# Patient Record
Sex: FEMALE | Race: WHITE
[De-identification: names, ages, dates, MRNs, and addresses within clinical notes are randomized per-mention and may not be internally consistent; named-entity substitution may affect disease eponyms.]

---

## 2017-04-04 ENCOUNTER — HOSPITAL ENCOUNTER (EMERGENCY)
Dept: HOSPITAL 25 - UCCORT | Age: 47
Discharge: HOME | End: 2017-04-04
Payer: COMMERCIAL

## 2017-04-04 VITALS — DIASTOLIC BLOOD PRESSURE: 63 MMHG | SYSTOLIC BLOOD PRESSURE: 106 MMHG

## 2017-04-04 DIAGNOSIS — Z88.5: ICD-10-CM

## 2017-04-04 DIAGNOSIS — Z72.0: ICD-10-CM

## 2017-04-04 DIAGNOSIS — J06.9: Primary | ICD-10-CM

## 2017-04-04 DIAGNOSIS — E07.9: ICD-10-CM

## 2017-04-04 PROCEDURE — 99211 OFF/OP EST MAY X REQ PHY/QHP: CPT

## 2017-04-04 PROCEDURE — G0463 HOSPITAL OUTPT CLINIC VISIT: HCPCS

## 2017-04-04 NOTE — UC
Throat Pain/Nasal Negro HPI





- HPI Summary


HPI Summary: 





NASAL CONGESTION AND COUGH X 2 DAYS


NO FEVER , + CHILLS AND BODY ACHES 





- History of Current Complaint


Chief Complaint: UCRespiratory


Stated Complaint: CONGESTION,HEADACHE


Time Seen by Provider: 04/04/17 09:56


Hx Obtained From: Patient


Hx Last Menstrual Period: 2007


Onset/Duration: Gradual Onset, Lasting Days - 2, Still Present


Severity: Moderate


Cough: Nonproductive


Associated Signs & Symptoms: Positive: Wheezing, Nasal Discharge.  Negative: 

Sinus Discomfort, Fever, Rash





- Allergies/Home Medications


Allergies/Adverse Reactions: 


 Allergies











Allergy/AdvReac Type Severity Reaction Status Date / Time


 


Morphine Allergy  Difficulty Verified 04/04/17 09:29





   Breathing  











Home Medications: 


 Home Medications





Citalopram TAB* [CeleXA TAB*] 20 mg PO DAILY 04/04/17 [History Confirmed 04/04/ 17]


Levothyroxine TAB* [Synthroid TAB*] 75 mcg PO DAILY 04/04/17 [History Confirmed 

04/04/17]











PMH/Surg Hx/FS Hx/Imm Hx





- Additional Past Medical History


Additional PMH: 





MEDS: LEVOTHYROXIN AND CITALOPRAM 


Endocrine History Of: Reports: Thyroid Disease





- Surgical History


Surgical History: Yes


Surgery Procedure, Year, and Place: partial hysterectomy , uterine exploraatory 

before hysterectomy.  mole removal from fac





- Family History


Known Family History: Positive: None


   Negative: Diabetes





- Social History


Alcohol Use: Occasionally


Substance Use Type: None


Smoking Status (MU): Current Some Day Smoker


Type: Cigarettes





- Immunization History


Most Recent Influenza Vaccination: 2013





Review of Systems


Constitutional: Chills, Fatigue


Skin: Negative


Eyes: Negative


ENT: Nasal Discharge


Respiratory: Cough


Cardiovascular: Negative


Gastrointestinal: Negative


Genitourinary: Negative


All Other Systems Reviewed And Are Negative: Yes





Physical Exam


Triage Information Reviewed: Yes


Appearance: Well-Appearing, No Pain Distress, Well-Nourished


Vital Signs: 


 Initial Vital Signs











Temp  98.3 F   04/04/17 09:22


 


Pulse  72   04/04/17 09:22


 


Resp  16   04/04/17 09:22


 


BP  106/63   04/04/17 09:22


 


Pulse Ox  98   04/04/17 09:22











Vital Signs Reviewed: Yes


Eye Exam: Normal


Eyes: Positive: Conjunctiva Clear


ENT: Positive: Pharyngeal erythema, Nasal congestion, Nasal drainage, TMs normal


Neck exam: Normal


Neck: Positive: Supple, Nontender, No Lymphadenopathy


Respiratory: Positive: Chest non-tender, Lungs clear, Normal breath sounds


Cardiovascular: Positive: RRR, No Murmur, Pulses Normal


Abdominal Exam: Normal


Skin Exam: Normal





Throat Pain/Nasal Course/Dx





- Differential Dx/Diagnosis


Provider Diagnoses: URI





Discharge





- Discharge Plan


Condition: Stable


Disposition: HOME


Patient Education Materials:  Upper Respiratory Infection (ED)


Forms:  *Work Release


Referrals: 


Rashaun Caruso MD [Primary Care Provider] - If Needed

## 2018-05-28 ENCOUNTER — HOSPITAL ENCOUNTER (EMERGENCY)
Dept: HOSPITAL 25 - UCCORT | Age: 48
Discharge: ANOTHER HOSPITAL | End: 2018-05-28
Payer: COMMERCIAL

## 2018-05-28 VITALS — DIASTOLIC BLOOD PRESSURE: 66 MMHG | SYSTOLIC BLOOD PRESSURE: 130 MMHG
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
## 2018-05-28 DIAGNOSIS — S81.802A: Primary | ICD-10-CM

## 2018-05-28 DIAGNOSIS — L08.9: ICD-10-CM

## 2018-05-28 DIAGNOSIS — Z88.5: ICD-10-CM

## 2018-05-28 DIAGNOSIS — F17.210: ICD-10-CM

## 2018-05-28 DIAGNOSIS — Z83.2: ICD-10-CM
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
## 2018-05-28 DIAGNOSIS — L03.116: ICD-10-CM
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
## 2018-05-28 PROCEDURE — 99212 OFFICE O/P EST SF 10 MIN: CPT

## 2018-05-28 PROCEDURE — G0463 HOSPITAL OUTPT CLINIC VISIT: HCPCS

## 2018-05-28 NOTE — UC
Lower Extremity/Ankle HPI





- HPI Summary


HPI Summary: 





48 yo female c/o progressively worse L lower leg pain.  Has been camping this 

weekend, and on Sat (today is Mon) accidently hit leg against a step.  Last 

evening pain and swelling increased, unable to sleep last night d/t pain.  No 

fever / chills.  Denies sob / cp / palpitations.  No GI issues.  Minimal 

drainage to wound.  Tet immun utd per pt. 





- History of Current Complaint


Chief Complaint: UCLowerExtremity


Stated Complaint: LT LEG COMP


Time Seen by Provider: 05/28/18 11:31


Hx Obtained From: Patient


Hx Last Menstrual Period: 2007


Severity Currently: Severe


Pain Intensity: 8


Pain Scale Used: 0-10 Numeric


Aggravating Factor(s): Standing, Ambulation


Alleviating Factor(s): Nothing





- Risk Factors


DVT Risk Factors: Smoking, Family Hx of Clotting Disorder - multiple 1st and 

2nd degree relatives with hx blood clots





- Allergies/Home Medications


Allergies/Adverse Reactions: 


 Allergies











Allergy/AdvReac Type Severity Reaction Status Date / Time


 


morphine Allergy  Difficulty Verified 05/28/18 11:18





   Breathing  














PMH/Surg Hx/FS Hx/Imm Hx





- Additional Past Medical History


Additional PMH: 





Rx'd for lymphoma approx 10 yrs ago, recently told by [facility] that she remains 

cancer free. 


Previously Healthy: Yes - see above and hpi





- Surgical History


Surgical History: Yes


Surgery Procedure, Year, and Place: partial hysterectomy , uterine exploraatory 

before hysterectomy.  mole removal from fac





- Family History


Known Family History: Positive: None


   Negative: Diabetes





- Social History


Occupation: Employed Full-time


Alcohol Use: Occasionally


Substance Use Type: None


Smoking Status (MU): Current Some Day Smoker


Type: Cigarettes





- Immunization History


Most Recent Influenza Vaccination: 2013





Review of Systems


Constitutional: Fatigue


Skin: Other - see hpi


Eyes: Negative


ENT: Negative


Respiratory: Negative


Cardiovascular: Negative


Gastrointestinal: Negative


Genitourinary: Negative


Motor: Other - see hpi


Neurovascular: Negative


Musculoskeletal: Other: - see hpi


Neurological: Negative


Psychological: Negative


Is Patient Immunocompromised?: No


All Other Systems Reviewed And Are Negative: Yes





Physical Exam


Triage Information Reviewed: Yes


Appearance: Well-Nourished - sitting up, able to ambulate by herself but slowly

, looks uncomfortable


Vital Signs: 


 Initial Vital Signs











Temp  98.3 F   05/28/18 11:15


 


Pulse  92   05/28/18 11:15


 


Resp  16   05/28/18 11:15


 


BP  130/66   05/28/18 11:15


 


Pulse Ox  100   05/28/18 11:15











Vital Signs Reviewed: Yes


Eye Exam: Normal - grossly normal


ENT Exam: Normal - grossly normal


Neck exam: Normal


Neck: Positive: Supple


Respiratory Exam: Normal - no dyspnea, no tachypnea


Cardiovascular Exam: Normal - heart rate regular.   Pulses DP, PT palpable 

bilat.   Feet both warm to touch, with good distal cap refill x 5 toes


Abdominal Exam: Normal - sitting up, no complaints


Musculoskeletal Exam: Other - R foot dorsal area with dark eschar and redness, 

warm to touch, nonfluctuant 6.5cm width x 5cm.   L ant tib dark necrotic wound 

irregular shape approx 1.8cm L x 1.0cm W.  No mel purulence, very tender.  No 

crepitus.  + Periwound redness, warmth to touch approx 25cm L x 18cm W.  The 

redness is streaking, as such it is irregular and not sharply demarcated. Post 

knees nontender.  L calf is swollen, without mel Latisha's but reports that 

skin hurts with post calf pressure. BLE evidence of venous insufficiency.


Neurological Exam: Normal - grossly nonfocal


Psychological Exam: Normal - conversing easily and appropriately


Skin Exam: Other - see above musc skel.  non-diaphoretic.





Lower Extremity Course/Dx





- Course


Course Of Treatment: LLE redness and swelling s/p wounding event 2 days ago.  

Concern for progressive cellulitis or worse infection.  No sob / cp.  Strong 

fam hx dvt.  Will benefit from ED evaluation and management, including abx and u

/s.  Ms. Mchugh reports that she will go directly to ED.  Requests [place] ED 

d/t proximity in the setting of childcare concerns.  Declines EMS, will drive, 

 will meet her there.  Questions as posed answered to the best of my 

ability.  I spoke with Katherine Mcdaniels NP [facility].





- Differential Dx/Diagnosis


Provider Diagnoses: Left ant leg wound infection.  Left leg cellulitis.  L leg 

redness and swelling / edema





Discharge





- Sign-Out/Discharge


Documenting (check all that apply): Discharge/Admit/Transfer - to [place] ED, 

via POV





- Discharge Plan


Condition: Guarded


Disposition: TRANS HIGHER LVL OF CARE FAC


Patient Education Materials:  Cellulitis (ED), Leg Edema (ED)


Referrals: 


Rashaun Caruso MD [Primary Care Provider] - 


Additional Instructions: 


Go directly to the Emergency Department.  


I spoke with the Nurse Practitioner at the [place] [facility].





Please call [phone] if problems en route.  











- Billing Disposition and Condition


Condition: GUARDED


Disposition: EMTALA

## 2019-02-14 ENCOUNTER — HOSPITAL ENCOUNTER (EMERGENCY)
Dept: HOSPITAL 25 - UCCORT | Age: 49
Discharge: HOME | End: 2019-02-14
Payer: COMMERCIAL

## 2019-02-14 VITALS — DIASTOLIC BLOOD PRESSURE: 70 MMHG | SYSTOLIC BLOOD PRESSURE: 115 MMHG

## 2019-02-14 DIAGNOSIS — J02.9: ICD-10-CM

## 2019-02-14 DIAGNOSIS — H10.9: Primary | ICD-10-CM

## 2019-02-14 DIAGNOSIS — Z88.5: ICD-10-CM

## 2019-02-14 DIAGNOSIS — F17.210: ICD-10-CM

## 2019-02-14 DIAGNOSIS — R51: ICD-10-CM

## 2019-02-14 PROCEDURE — 99212 OFFICE O/P EST SF 10 MIN: CPT

## 2019-02-14 PROCEDURE — G0463 HOSPITAL OUTPT CLINIC VISIT: HCPCS

## 2019-02-14 NOTE — UC
Eye Complaint HPI





- HPI Summary


HPI Summary: 





2 days of eye discharge left greater than right, with injected eye. Normal 

vision. Has mild headache and sore throat today, but no fever or cough. 





- History of Current Complaint


Chief Complaint: UCEye


Stated Complaint: LEFT EYE CONCERN


Time Seen by Provider: 02/14/19 08:10


Hx Obtained From: Patient


Hx Last Menstrual Period: 2007


Onset/Duration: Sudden Onset, Lasting Days - 2


Timing: Constant


Severity Initially: Mild


Severity Currently: Mild


Pain Intensity: 0


Aggravating Factor(s): Nothing


Alleviating Factor(s): Nothing


Associated Signs And Symptoms: Positive: Drainage (Purulent)





- Risk Factors


Penetrating Injury Risk Factor: Negative


Globe Rupture Risk Factors: Negative


Optic Artery Occlusion Risk Factors: Negative





- Allergies/Home Medications


Allergies/Adverse Reactions: 


 Allergies











Allergy/AdvReac Type Severity Reaction Status Date / Time


 


morphine Allergy  Difficulty Verified 02/14/19 07:42





   Breathing  














PMH/Surg Hx/FS Hx/Imm Hx


Previously Healthy: Yes - History of lymphoma in 2008, cured


Endocrine History: Hypothyroidism


Psychological History: Depression





- Surgical History


Surgical History: Yes


Surgery Procedure, Year, and Place: partial hysterectomy , uterine exploraatory 

before hysterectomy.  mole removal from fac





- Family History


Known Family History: Positive: Cardiac Disease, Diabetes - mother





- Social History


Occupation: Employed Full-time


Alcohol Use: Occasionally


Substance Use Type: None


Smoking Status (MU): Light Every Day Tobacco Smoker


Type: Cigarettes


Amount Used/How Often: 1/2 PPD





- Immunization History


Most Recent Influenza Vaccination: 2013





Review of Systems


All Other Systems Reviewed And Are Negative: Yes


Constitutional: Positive: Negative


Skin: Positive: Negative


Eyes: Positive: Drainage, Eye Redness


ENT: Positive: Nasal Discharge


Respiratory: Positive: Negative


Cardiovascular: Positive: Negative


Gastrointestinal: Positive: Negative


Genitourinary: Positive: Negative


Motor: Positive: Negative


Neurovascular: Positive: Negative


Musculoskeletal: Positive: Negative


Neurological: Positive: Headache


Psychological: Positive: Negative


Is Patient Immunocompromised?: No





Physical Exam


Triage Information Reviewed: Yes


Appearance: Well-Appearing, No Pain Distress


Vital Signs: 


 Initial Vital Signs











Temp  98.0 F   02/14/19 07:42


 


Pulse  100   02/14/19 07:42


 


Resp  16   02/14/19 07:42


 


BP  115/70   02/14/19 07:42


 


Pulse Ox  98   02/14/19 07:42











Eye Exam: Other - no photophobia, no lid swelling


Eyes: Positive: Conjunctiva Inflamed, Discharge - scant discharge both eyes


ENT: Positive: Pharyngeal erythema, TMs normal


Neck: Positive: Supple, Nontender, No Lymphadenopathy


Respiratory: Positive: Lungs clear, Normal breath sounds


Cardiovascular: Positive: RRR, No Murmur


Neurological Exam: Normal


Neurological: Positive: Alert


Psychological Exam: Normal


Skin Exam: Normal





Eye Complaint Course/Dx





- Course


Course Of Treatment: antibacterial drops for conjunctivitis





- Differential Dx/Diagnosis


Differential Diagnosis/HQI/PQRI: Conjunctivitis


Provider Diagnosis: 


 Bilateral conjunctivitis








Discharge





- Sign-Out/Discharge


Documenting (check all that apply): Patient Departure


All imaging exams completed and their final reports reviewed: No Studies





- Discharge Plan


Condition: Stable


Disposition: HOME


Prescriptions: 


Polymyx/Trimethoprim OPTH* [Polytrim OPHTH*] 2 drop BOTH EYES Q3H #1 btl


Patient Education Materials:  Conjunctivitis (ED)


Forms:  *Work Release


Referrals: 


Rashaun Caruso MD [Primary Care Provider] - 


Additional Instructions: 


Compress your eyes with a warm moist cloth, then use drops to both eyes every 3 

hours while awake until resolved, usually 4 or 5 days. Disocontinue drops if 

they cause any eye irritation and follow up with your primary doctor. 








- Billing Disposition and Condition


Condition: STABLE


Disposition: Home

## 2020-02-18 ENCOUNTER — HOSPITAL ENCOUNTER (EMERGENCY)
Dept: HOSPITAL 25 - UCCORT | Age: 50
Discharge: HOME | End: 2020-02-18
Payer: COMMERCIAL

## 2020-02-18 VITALS — SYSTOLIC BLOOD PRESSURE: 122 MMHG | DIASTOLIC BLOOD PRESSURE: 70 MMHG

## 2020-02-18 DIAGNOSIS — Z88.5: ICD-10-CM

## 2020-02-18 DIAGNOSIS — J06.9: Primary | ICD-10-CM

## 2020-02-18 DIAGNOSIS — Z85.72: ICD-10-CM

## 2020-02-18 DIAGNOSIS — F17.210: ICD-10-CM

## 2020-02-18 PROCEDURE — 99211 OFF/OP EST MAY X REQ PHY/QHP: CPT

## 2020-02-18 PROCEDURE — G0463 HOSPITAL OUTPT CLINIC VISIT: HCPCS

## 2020-02-18 NOTE — UC
Respiratory Complaint HPI





- HPI Summary


HPI Summary: 





cough x 4 days 


cough is productive with yellow/ green sputum


worse with deep breathing, better with rest,


sore throat, pnd, fever / chills and body aches 





- History of Current Complaint


Chief Complaint: UCGeneralIllness


Stated Complaint: SORE THROAT, CONGESTION


Time Seen by Provider: 02/18/20 08:47


Hx Obtained From: Patient


Hx Last Menstrual Period: 2007


Pregnant?: No


Onset/Duration: Gradual Onset, Lasting Days - 4, Still Present


Timing: Constant


Severity Initially: Moderate


Severity Currently: Moderate


Pain Intensity: 3


Character: Cough: Productive


Aggravating Factors: Exertion, Deep Breaths


Alleviating Factors: Nothing


Associated Signs And Symptoms: Positive: Fever, Chills, URI, Nasal Congestion.  

Negative: Dyspnea, Wheezing, Calf Pain





- Allergies/Home Medications


Allergies/Adverse Reactions: 


 Allergies











Allergy/AdvReac Type Severity Reaction Status Date / Time


 


morphine Allergy  Difficulty Verified 02/18/20 08:47





   Breathing  











Home Medications: 


 Home Medications





Diphenhydra/Phenyleph/Acetamin [Cold & Flu Relief Multi-Sym Lq] 180 ml PO ONCE 

PRN 02/18/20 [History Confirmed 02/18/20]











PMH/Surg Hx/FS Hx/Imm Hx





- Additional Past Medical History


Additional PMH: 





Non hodgkins lymphoma- 2008-cured





- Surgical History


Surgical History: Yes


Surgery Procedure, Year, and Place: partial hysterectomy , uterine exploraatory 

before hysterectomy.  mole removal from fac





- Family History


Known Family History: Positive: None, Cardiac Disease, Diabetes - mother





- Social History


Alcohol Use: Occasionally


Substance Use Type: None


Smoking Status (MU): Light Every Day Tobacco Smoker


Type: Cigarettes


Amount Used/How Often: 1/2 PPD


Length of Time of Smoking/Using Tobacco: 30 yr


Have You Smoked in the Last Year: Yes





- Immunization History


Most Recent Influenza Vaccination: 2013





Review of Systems


All Other Systems Reviewed And Are Negative: Yes


Constitutional: Positive: Fever, Chills, Fatigue


ENT: Positive: Sore Throat


Respiratory: Positive: Cough


Musculoskeletal: Positive: Arthralgia, Myalgia


Is Patient Immunocompromised?: No





Physical Exam


Triage Information Reviewed: Yes


Appearance: Well-Appearing, No Pain Distress, Well-Nourished


Vital Signs: 


 Initial Vital Signs











Temp  97.1 F   02/18/20 08:40


 


Pulse  94   02/18/20 08:40


 


Resp  18   02/18/20 08:40


 


BP  122/70   02/18/20 08:40


 


Pulse Ox  97   02/18/20 08:40











Vital Signs Reviewed: Yes


Eye Exam: Normal


Eyes: Positive: Conjunctiva Clear


ENT: Positive: Normal ENT inspection, Hearing grossly normal, Pharyngeal 

erythema, TMs normal.  Negative: TM bulging, TM dull, TM red


Neck exam: Normal


Neck: Positive: Supple, Nontender, No Lymphadenopathy


Respiratory: Positive: Chest non-tender, Lungs clear, Normal breath sounds


Cardiovascular: Positive: RRR, No Murmur, Pulses Normal





Respiratory Course/Dx





- Differential Dx/Diagnosis


Provider Diagnosis: 


 URI (upper respiratory infection)








Discharge ED





- Sign-Out/Discharge


Documenting (check all that apply): Patient Departure


All imaging exams completed and their final reports reviewed: No Studies





- Discharge Plan


Condition: Stable


Disposition: HOME


Patient Education Materials:  Upper Respiratory Infection (ED)


Referrals: 


Rashaun Caruso MD [Primary Care Provider] - If Needed





- Billing Disposition and Condition


Condition: STABLE


Disposition: Home

## 2020-03-12 ENCOUNTER — HOSPITAL ENCOUNTER (EMERGENCY)
Dept: HOSPITAL 25 - UCCORT | Age: 50
Discharge: HOME | End: 2020-03-12
Payer: COMMERCIAL

## 2020-03-12 VITALS — DIASTOLIC BLOOD PRESSURE: 70 MMHG | SYSTOLIC BLOOD PRESSURE: 124 MMHG

## 2020-03-12 DIAGNOSIS — R51: ICD-10-CM

## 2020-03-12 DIAGNOSIS — E03.9: ICD-10-CM

## 2020-03-12 DIAGNOSIS — Z88.5: ICD-10-CM

## 2020-03-12 DIAGNOSIS — R05: ICD-10-CM

## 2020-03-12 DIAGNOSIS — B34.9: Primary | ICD-10-CM

## 2020-03-12 DIAGNOSIS — F41.9: ICD-10-CM

## 2020-03-12 DIAGNOSIS — F17.210: ICD-10-CM

## 2020-03-12 DIAGNOSIS — M79.10: ICD-10-CM

## 2020-03-12 DIAGNOSIS — Z79.899: ICD-10-CM

## 2020-03-12 DIAGNOSIS — Z79.890: ICD-10-CM

## 2020-03-12 PROCEDURE — G0463 HOSPITAL OUTPT CLINIC VISIT: HCPCS

## 2020-03-12 PROCEDURE — 99212 OFFICE O/P EST SF 10 MIN: CPT

## 2020-03-12 NOTE — UC
FLU HPI





- HPI Summary


HPI Summary: 





48 yo, awoke today with myalgias, headache, malaise, mild cough. 


Has not taken antipyretic. 


She is concerned that she has the flu. No hx of pneumona or asthma. 





- History of Current Complaint


Chief Complaint: UCGeneralIllness


Stated Complaint: FLU SYMP


Time Seen by Provider: 03/12/20 08:52


Hx Obtained From: Patient


Hx Last Menstrual Period: 2007


Onset/Duration: Sudden Onset, Lasting Hours


Severity Currently: Mild


Severity Initially: Mild


Pain Intensity: 0


Associated Signs & Symptoms: Positive: Myalgia, Cough, Headache





- Risk Factors


Influenza Risk Factors: Negative





- Allergy/Home Medications


Allergies/Adverse Reactions: 


 Allergies











Allergy/AdvReac Type Severity Reaction Status Date / Time


 


morphine Allergy  Difficulty Verified 03/12/20 08:40





   Breathing  











Home Medications: 


 Home Medications





Citalopram TAB* [CeleXA TAB*] 20 mg PO DAILY 04/04/17 [History Confirmed 03/12/ 20]


Levothyroxine TAB* [Synthroid TAB*] 75 mcg PO DAILY 04/04/17 [History Confirmed 

03/12/20]


Oseltamivir CAP* [Tamiflu CAP*] 75 mg PO BID #10 cap 03/12/20 [Rx]











PMH/Surg Hx/FS Hx/Imm Hx


Previously Healthy: Yes


Endocrine History: Hypothyroidism


Psychological History: Anxiety





- Surgical History


Surgical History: Yes


Surgery Procedure, Year, and Place: partial hysterectomy , uterine exploraatory 

before hysterectomy.  mole removal from fac





- Family History


Known Family History: Positive: Cardiac Disease, Diabetes - mother, Other - DVT





- Social History


Occupation: Employed Full-time


Lives: With Family


Alcohol Use: Occasionally


Substance Use Type: None


Smoking Status (MU): Light Every Day Tobacco Smoker


Type: Cigarettes


Amount Used/How Often: 1/2 PPD


Length of Time of Smoking/Using Tobacco: 30 yr


Have You Smoked in the Last Year: Yes





- Immunization History


Most Recent Influenza Vaccination: 2013





Review of Systems


All Other Systems Reviewed And Are Negative: Yes


Constitutional: Positive: Negative, Fatigue


Skin: Positive: Negative


Eyes: Positive: Negative


ENT: Positive: Ear Ache


Respiratory: Positive: Cough - minimal, Other - smokes cigarettes


Cardiovascular: Positive: Negative


Gastrointestinal: Positive: Negative


Genitourinary: Positive: Negative


Motor: Positive: Negative


Neurovascular: Positive: Negative


Musculoskeletal: Positive: Negative


Neurological/Mental Status: Positive: Negative


Psychological: Positive: Negative





Physical Exam


Triage Information Reviewed: Yes


Appearance: Ill-Appearing - looks mildly unwell, Obese


Vital Signs: 


 Initial Vital Signs











Temp  98.4 F   03/12/20 08:37


 


Pulse  96   03/12/20 08:37


 


Resp  17   03/12/20 08:37


 


BP  124/70   03/12/20 08:37


 


Pulse Ox  99   03/12/20 08:37











Eyes: Positive: Conjunctiva Clear


ENT: Positive: Pharynx normal, TMs normal


Neck: Positive: Supple, Nontender, No Lymphadenopathy


Respiratory: Positive: Lungs clear, Normal breath sounds


Cardiovascular: Positive: RRR, No Murmur, Pulses Normal


Musculoskeletal Exam: Normal


Neurological Exam: Normal


Psychological Exam: Normal


Skin Exam: Normal





Diagnostics





- Laboratory


Lab Results: 





influenza negative. 





Flu Course/Dx





- Course


Course Of Treatment: 


Because of close flu contact at work, she would like to take Tamiflu, which is 

not unreasonable given that she has hx of smoking. 





- Differential Dx/Diagnosis


Differential Diagnosis/HQI/PQRI: Influenza, Pneumonia, Upper Respiratory 

Infection


Provider Diagnosis: 


 Viral syndrome








Discharge ED





- Sign-Out/Discharge


Documenting (check all that apply): Patient Departure


All imaging exams completed and their final reports reviewed: No Studies





- Discharge Plan


Condition: Stable


Disposition: HOME


Prescriptions: 


Oseltamivir CAP* [Tamiflu CAP*] 75 mg PO BID #10 cap


Patient Education Materials:  Viral Syndrome (ED)


Forms:  *Work Release


Referrals: 


Rashaun Caruso MD [Primary Care Provider] - 


Additional Instructions: 


Although your flu testing is negative, you will take Tamiflu because of the 

exposures you have had and the onset of flu like symptoms. 


Rest at home, increase fluids, and use ibuprofen or tylenol for relief of 

muscle aches. 


Follow up if you have increasing shortness of breath or chest pain, but you 

could develop a fever despite the use of Tamiflu. 





- Billing Disposition and Condition


Condition: STABLE


Disposition: Home